# Patient Record
Sex: FEMALE | Race: WHITE
[De-identification: names, ages, dates, MRNs, and addresses within clinical notes are randomized per-mention and may not be internally consistent; named-entity substitution may affect disease eponyms.]

---

## 2019-07-03 ENCOUNTER — HOSPITAL ENCOUNTER (EMERGENCY)
Dept: HOSPITAL 62 - ER | Age: 54
LOS: 1 days | Discharge: HOME | End: 2019-07-04
Payer: COMMERCIAL

## 2019-07-03 DIAGNOSIS — F17.200: ICD-10-CM

## 2019-07-03 DIAGNOSIS — M79.605: Primary | ICD-10-CM

## 2019-07-03 DIAGNOSIS — M79.89: ICD-10-CM

## 2019-07-03 DIAGNOSIS — I10: ICD-10-CM

## 2019-07-03 LAB
ADD MANUAL DIFF: NO
ANION GAP SERPL CALC-SCNC: 9 MMOL/L (ref 5–19)
BASOPHILS # BLD AUTO: 0.1 10^3/UL (ref 0–0.2)
BASOPHILS NFR BLD AUTO: 0.7 % (ref 0–2)
BUN SERPL-MCNC: 5 MG/DL (ref 7–20)
CALCIUM: 9.1 MG/DL (ref 8.4–10.2)
CHLORIDE SERPL-SCNC: 99 MMOL/L (ref 98–107)
CO2 SERPL-SCNC: 26 MMOL/L (ref 22–30)
EOSINOPHIL # BLD AUTO: 0.2 10^3/UL (ref 0–0.6)
EOSINOPHIL NFR BLD AUTO: 3.1 % (ref 0–6)
ERYTHROCYTE [DISTWIDTH] IN BLOOD BY AUTOMATED COUNT: 14.1 % (ref 11.5–14)
GLUCOSE SERPL-MCNC: 114 MG/DL (ref 75–110)
HCT VFR BLD CALC: 44 % (ref 36–47)
HGB BLD-MCNC: 15.2 G/DL (ref 12–15.5)
LYMPHOCYTES # BLD AUTO: 1.9 10^3/UL (ref 0.5–4.7)
LYMPHOCYTES NFR BLD AUTO: 25.9 % (ref 13–45)
MCH RBC QN AUTO: 33.5 PG (ref 27–33.4)
MCHC RBC AUTO-ENTMCNC: 34.7 G/DL (ref 32–36)
MCV RBC AUTO: 97 FL (ref 80–97)
MONOCYTES # BLD AUTO: 0.6 10^3/UL (ref 0.1–1.4)
MONOCYTES NFR BLD AUTO: 7.4 % (ref 3–13)
NEUTROPHILS # BLD AUTO: 4.7 10^3/UL (ref 1.7–8.2)
NEUTS SEG NFR BLD AUTO: 62.9 % (ref 42–78)
PLATELET # BLD: 210 10^3/UL (ref 150–450)
POTASSIUM SERPL-SCNC: 4.1 MMOL/L (ref 3.6–5)
RBC # BLD AUTO: 4.54 10^6/UL (ref 3.72–5.28)
SODIUM SERPL-SCNC: 134.4 MMOL/L (ref 137–145)
TOTAL CELLS COUNTED % (AUTO): 100 %
WBC # BLD AUTO: 7.4 10^3/UL (ref 4–10.5)

## 2019-07-03 PROCEDURE — 80048 BASIC METABOLIC PNL TOTAL CA: CPT

## 2019-07-03 PROCEDURE — 85025 COMPLETE CBC W/AUTO DIFF WBC: CPT

## 2019-07-03 PROCEDURE — 36415 COLL VENOUS BLD VENIPUNCTURE: CPT

## 2019-07-03 PROCEDURE — 93971 EXTREMITY STUDY: CPT

## 2019-07-03 PROCEDURE — 99284 EMERGENCY DEPT VISIT MOD MDM: CPT

## 2019-07-03 NOTE — ER DOCUMENT REPORT
ED Medical Screen (RME)





- General


Chief Complaint: Leg Pain


Stated Complaint: PAIN IN LEFT LEG


Time Seen by Provider: 07/03/19 22:12


Primary Care Provider: 


MATEUS BARNES [Primary Care Provider] - Follow up as needed


Notes: 





Patient is a 53-year-old female with a history of hypertension who presents to 

the emergency department with the acute onset of left lower calf pain and leg 

swelling.  Patient states that this occurred at 10 AM this morning.  Patient 

denies injury or fall.  Patient states she does not have a history DVT.  Patient

denies any open wounds or lacerations.  Patient denies a history of cellulitis. 

Patient denies a history of diabetes.  Denies recent long car rides or travel.


TRAVEL OUTSIDE OF THE [place] IN LAST 30 DAYS: No





Physical Exam





- Vital signs


Vitals: 





                                        











Temp Pulse Resp BP Pulse Ox


 


 98.0 F   87   20   147/93 H  95 


 


 07/03/19 21:57  07/03/19 21:57  07/03/19 21:57  07/03/19 21:57  07/03/19 21:57














- Extremities


Notes: 





Left calf is warm to touch, there is significant edema to the left lower 

extremity including the foot.  No erythema or open wounds.





Course





- Re-evaluation


Re-evalutation: 





07/03/19 22:14


I have greeted and performed a rapid initial assessment of this patient.  A c

omprehensive ED assessment and evaluation of the patient, analysis of test 

results and completion of the medical decision making process will be conducted 

by additional ED providers.





- Vital Signs


Vital signs: 





                                        











Temp Pulse Resp BP Pulse Ox


 


 98.0 F   87   20   147/93 H  95 


 


 07/03/19 21:57  07/03/19 21:57  07/03/19 21:57  07/03/19 21:57  07/03/19 21:57














Doctor's Discharge





- Discharge


Referrals: 


MATEUS BARNES [Primary Care Provider] - Follow up as needed

## 2019-07-04 VITALS — DIASTOLIC BLOOD PRESSURE: 111 MMHG | SYSTOLIC BLOOD PRESSURE: 145 MMHG

## 2019-07-04 NOTE — ER DOCUMENT REPORT
ED Extremity Problem, Lower





- General


TRAVEL OUTSIDE OF THE [place] IN LAST 30 DAYS: No





<ERUM JIMENEZ - Last Filed: 07/04/19 06:42>





<TATI MENDOZA - Last Filed: 07/04/19 11:54>





- General


Chief Complaint: Leg Pain


Stated Complaint: PAIN IN LEFT LEG


Time Seen by Provider: 07/03/19 22:12


Primary Care Provider: 


CAMERON,MATEUS [NO LOCAL MD] - Follow up as needed


Notes: 





Patient is a 53-year-old female with a history of hypertension who presents to 

the emergency department with left calf pain and swelling that started at 10 AM 

yesterday morning.  Patient denies injury or fall.  Patient denies any recent 

car travel.  Patient denies recent surgery.  Patient states that the pain is 

worse with movement and walking.  Patient states that she is having a calf pain 

and redness to the site.  Patient denies fever.  Feels like an aching in nature.

(ERUM JIMENEZ)





Past Medical History





- General


Information source: Patient





- Social History


Smoking Status: Current Every Day Smoker


Cigarette use (# per day): No


Chew tobacco use (# tins/day): No


Frequency of alcohol use: Heavy


Drug Abuse: None


Lives with: Family


Family History: None


Patient has suicidal ideation: No


Patient has homicidal ideation: No





- Past Medical History


Cardiac Medical History: Reports: Hx Hypertension


Pulmonary Medical History: Reports: None


EENT Medical History: Reports: None


Neurological Medical History: Reports: None


Endocrine Medical History: Reports: None


Renal/ Medical History: Reports: None.  Denies: Hx Peritoneal Dialysis


Malignancy Medical History: Reports: None


GI Medical History: Reports: None


Musculoskeletal Medical History: Reports None


Skin Medical History: Reports None


Psychiatric Medical History: Reports: Hx Depression


Traumatic Medical History: Reports: None


Infectious Medical History: Reports: None


Past Surgical History: Reports: Hx Orthopedic Surgery - shoulder





<ERUM JIMENEZ - Last Filed: 07/04/19 06:42>





Review of Systems





- Review of Systems


Constitutional: No symptoms reported


EENT: No symptoms reported


Cardiovascular: No symptoms reported


Respiratory: No symptoms reported


Gastrointestinal: No symptoms reported


Genitourinary: No symptoms reported


Female Genitourinary: No symptoms reported


Musculoskeletal: See HPI


Skin: See HPI


Hematologic/Lymphatic: No symptoms reported


Neurological/Psychological: No symptoms reported





<ERUM JIMENEZ - Last Filed: 07/04/19 06:42>





Physical Exam





- Vital signs


Interpretation: Hypertensive





<ERUM JIMENEZ - Last Filed: 07/04/19 06:42>





- Vital signs


Vitals: 


                                        











Temp Pulse Resp BP Pulse Ox


 


 98.0 F   87   20   147/93 H  95 


 


 07/03/19 21:57  07/03/19 21:57  07/03/19 21:57  07/03/19 21:57  07/03/19 21:57














- Notes


Notes: 





GENERAL: Well-appearing, well-nourished and in no acute distress.


HEAD: Atraumatic, normocephalic.


EYES: Pupils equal round and reactive to light, extraocular movements intact, 

sclera anicteric, conjunctiva are normal.


ENT: TMs normal, nares patent, oropharynx clear without exudates.  Moist mucous 

membranes.


NECK: Normal range of motion, supple without lymphadenopathy or JVD.


LUNGS: Breath sounds clear to auscultation bilaterally and equal.  No wheezes 

rales or rhonchi.


HEART: Regular rate and rhythm without murmurs, rubs or gallops.


ABDOMEN: Soft, nontender, normoactive bowel sounds.  No guarding, no rebound.  

No masses appreciated.


BACK: No cervical, thoracic, lumbar midline tenderness.  No saddle anesthesia, 

normal distal neurovascular exam.


GENITOURINARY: Deferred.


EXTREMITIES: Normal range of motion, + edema to left lower extremity that 

extends into the left foot, calf tenderness with palpation, minimal amount of 

erythema noted to the left calf. + dorsalis pedis pulse and posterior tibial 

pulse. No clubbing or cyanosis.


NEUROLOGICAL: Cranial nerves II through XII grossly intact.  Normal speech, 

normal gait.


PSYCH: Normal mood, normal affect.


SKIN: Warm, Dry, normal turgor, no rashes or lesions noted. (TONYERUM)





Course





- Laboratory


Result Diagrams: 


                                 07/03/19 22:49





                                 07/03/19 22:49





<TONYLIS BALDWINCA - Last Filed: 07/04/19 06:42>





- Laboratory


Result Diagrams: 


                                 07/03/19 22:49





                                 07/03/19 22:49





<TATI MENDOZA - Last Filed: 07/04/19 11:54>





- Re-evaluation


Re-evalutation: 





07/04/19 01:05


Upon evaluation of patient she is resting comfortably on stretcher.  Patient 

continues to have left lower extremity edema.  I had seen the patient originally

in triage and there is no significant change in the swelling.  I do believe 

patient needs a Doppler ultrasound of the left lower extremity.  I did inform 

the patient that the staff to perform this procedure is not available until 7:30

in the morning.  Patient verbalizes understanding and would like to wait 

overnight in the emergency department as she knows something is not right.  I 

will medicate the patient appropriately and monitor throughout the night.


07/04/19 06:42


Patient resting comfortably on stretcher.  Patient states that the pain pill did

help her earlier but now she is starting to have pain.  The swelling in the left

foot does appear less swollen but patient does have significant left calf pain 

and swelling and extreme tenderness.  I did update the patient that early this 

morning they will be doing her Doppler ultrasound.  Patient denies questions or 

concerns at this time. (ERUM JIMENEZ)





07/04/19 08:52


Assumed care of patient from Erum Jimenez NP.  Awaiting venous Doppler.


07/04/19 10:03


Ultrasound tech reported to me that there is a nonvascular complex heterogeneous

mass 5 x 3 x 2 cm, no evidence of DVT or superficial venous thrombosis.  

Awaiting Dr. Rosa, radiologist, to read to give patient proper guidance for 

outpatient follow-up.


07/04/19 11:50


I made repeated attempts to contact the radiologist but was unsuccessful.  

Patient does not have a DVT and she is in the process of establishing a primary 

care doctor.  At this time there is no dangerous condition that would require 

immediate attention or treatment.  I instructed patient that she can wear 

compression stockings and elevate the leg as needed.  Patient is in pain so I 

will treat her pain prior to discharge.  At this time her vital signs are within

normal limits and she has a strong dorsalis pedis and posterior tibialis pulse 

of the left leg.  She is stable for discharge. (TATI MENDOZA)





- Vital Signs


Vital signs: 


                                        











Temp Pulse Resp BP Pulse Ox


 


 97.6 F   87   18   145/88 H  97 


 


 07/04/19 11:09  07/04/19 11:09  07/04/19 11:09  07/04/19 11:09  07/04/19 11:09














- Laboratory


Laboratory results interpreted by me: 


                                        











  07/03/19 07/03/19





  22:49 22:49


 


MCH  33.5 H 


 


RDW  14.1 H 


 


Sodium   134.4 L


 


BUN   5 L


 


Glucose   114 H














Discharge





<ERUM JIMENEZ - Last Filed: 07/04/19 06:42>





<MARCELINO MENDOZAEL - Last Filed: 07/04/19 11:54>





- Discharge


Clinical Impression: 


 Calf swelling





Condition: Good


Disposition: HOME, SELF-CARE


Additional Instructions: 


You were seen in the emergency department last night and today for calf swel

ling.  I apologize for your long wait.  The ultrasound did not show any evidence

of a DVT or blood clot.  It did show a complex mass of some type that will 

require follow-up outpatient.  It is unclear what it is at this time as 

radiology read is pending.  Regardless, there is nothing life-threatening at 

this time.  If there are any concerning changes you will receive a phone call 

from someone in the emergency department.  Please return to the emergency 

department if you develop fever, swelling of your entire leg above the knee, 

inability to bear weight on it, acute shortness of breath or chest pain, or you 

have any other concerning symptoms.


Referrals: 


LOCALMD,NO [NO LOCAL MD] - Follow up as needed

## 2019-07-05 NOTE — XCELERA REPORT
[address] [address]

                               Tel: [phone]

                               Fax: [phone]



                       Lower Extremity Venous Evaluation

_______________________________________________________________________________



_______________________________________________________________________________

Procedure: Color flow and duplex imaging of the veins of the left lower

extremity as well as the right Common Femoral vein.





_______________________________________________________________________________



_______________________________________________________________________________



Right Sided Venous Evaluation

The right common femoral vein is fully compressible. Spontaneous and phasic

flow is present in the right common femoral vein.



Left Sided Venous Evaluation

Lucent,spaces in subcutaneous tissues of the leg, suggesting edema, noted.

Also a large, 5 x 3 x 2 cms, non vascular,complex mass in the Popliteal fossa.

Normal vessel filling wall to wall, compression and augmentation as well as

Colour flow down to the infrageniculate veins.





Critical Findings

Study discussed with the patient.

_______________________________________________________________________________



Interpretation Summary

No duplex evidence of DVT or obstruction in the left lower extremity nor in

the right Common Femoral vein. A large left Popliteal cyst is noted, this

could be a Baker's cyst. With leg edema.



_______________________________________________________________________________

Name: IRAIDA GALLAGHER

MRN: [id number]

Age: 53 yrs

Gender: Female                                        : 1965

Patient Status: Emergency                             Patient Location: ER

Account #: [id number]

Study Date: 2019 09:07 AM

                          Accession #: [id number]

Reason For Study: left lower extremity swelling, calf pain

Ordering Physician: ERUM JIMENEZ

Performed By: Roxy Parkinson

Electronically signed by:      Lennox Williams      on 2019 05:27 PM



CC: ERUM JIMENEZ

>

Williams, Lennox